# Patient Record
Sex: FEMALE | Race: WHITE | Employment: OTHER | URBAN - METROPOLITAN AREA
[De-identification: names, ages, dates, MRNs, and addresses within clinical notes are randomized per-mention and may not be internally consistent; named-entity substitution may affect disease eponyms.]

---

## 2017-02-14 PROBLEM — E53.8 B12 DEFICIENCY: Status: ACTIVE | Noted: 2017-02-14

## 2017-12-29 ENCOUNTER — APPOINTMENT (OUTPATIENT)
Dept: CT IMAGING | Age: 82
DRG: 641 | End: 2017-12-29
Attending: EMERGENCY MEDICINE
Payer: MEDICARE

## 2017-12-29 ENCOUNTER — HOSPITAL ENCOUNTER (INPATIENT)
Age: 82
LOS: 1 days | Discharge: NURSING, REHAB OR LONG-TERM CARE | DRG: 641 | End: 2017-12-30
Attending: EMERGENCY MEDICINE | Admitting: INTERNAL MEDICINE
Payer: MEDICARE

## 2017-12-29 ENCOUNTER — APPOINTMENT (OUTPATIENT)
Dept: GENERAL RADIOLOGY | Age: 82
DRG: 641 | End: 2017-12-29
Attending: EMERGENCY MEDICINE
Payer: MEDICARE

## 2017-12-29 DIAGNOSIS — R53.83 FATIGUE, UNSPECIFIED TYPE: Primary | ICD-10-CM

## 2017-12-29 DIAGNOSIS — J18.9 HAP (HOSPITAL-ACQUIRED PNEUMONIA): ICD-10-CM

## 2017-12-29 DIAGNOSIS — R77.8 ELEVATED TROPONIN I LEVEL: ICD-10-CM
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
## 2017-12-29 DIAGNOSIS — R74.8 ABNORMAL CK: ICD-10-CM

## 2017-12-29 PROBLEM — E53.8 B12 DEFICIENCY: Chronic | Status: ACTIVE | Noted: 2017-02-14

## 2017-12-29 PROBLEM — I21.4 NSTEMI (NON-ST ELEVATED MYOCARDIAL INFARCTION) (HCC): Status: ACTIVE | Noted: 2017-12-29

## 2017-12-29 PROBLEM — F03.90 DEMENTIA (HCC): Chronic | Status: ACTIVE | Noted: 2017-12-29

## 2017-12-29 LAB
ALBUMIN SERPL-MCNC: 3.4 G/DL (ref 3.5–5)
ALBUMIN/GLOB SERPL: 0.8 {RATIO} (ref 1.1–2.2)
ALP SERPL-CCNC: 46 U/L (ref 45–117)
ALT SERPL-CCNC: 89 U/L (ref 12–78)
ANION GAP SERPL CALC-SCNC: 9 MMOL/L (ref 5–15)
APPEARANCE UR: CLEAR
AST SERPL-CCNC: 179 U/L (ref 15–37)
BACTERIA URNS QL MICRO: NEGATIVE /HPF
BASOPHILS # BLD: 0 K/UL (ref 0–0.1)
BASOPHILS NFR BLD: 1 % (ref 0–1)
BILIRUB SERPL-MCNC: 0.4 MG/DL (ref 0.2–1)
BILIRUB UR QL: NEGATIVE
BNP SERPL-MCNC: 7148 PG/ML (ref 0–450)
BUN SERPL-MCNC: 18 MG/DL (ref 6–20)
BUN/CREAT SERPL: 20 (ref 12–20)
CALCIUM SERPL-MCNC: 8.7 MG/DL (ref 8.5–10.1)
CHLORIDE SERPL-SCNC: 104 MMOL/L (ref 97–108)
CK MB CFR SERPL CALC: 10.7 % (ref 0–2.5)
CK MB SERPL-MCNC: 389.6 NG/ML (ref 5–25)
CK SERPL-CCNC: 2783 U/L (ref 26–192)
CK SERPL-CCNC: 3645 U/L (ref 26–192)
CO2 SERPL-SCNC: 27 MMOL/L (ref 21–32)
COLOR UR: ABNORMAL
CREAT SERPL-MCNC: 0.91 MG/DL (ref 0.55–1.02)
EOSINOPHIL # BLD: 0.1 K/UL (ref 0–0.4)
EOSINOPHIL NFR BLD: 2 % (ref 0–7)
EPITH CASTS URNS QL MICRO: ABNORMAL /LPF
ERYTHROCYTE [DISTWIDTH] IN BLOOD BY AUTOMATED COUNT: 15.2 % (ref 11.5–14.5)
GLOBULIN SER CALC-MCNC: 4.1 G/DL (ref 2–4)
GLUCOSE SERPL-MCNC: 99 MG/DL (ref 65–100)
GLUCOSE UR STRIP.AUTO-MCNC: NEGATIVE MG/DL
HCT VFR BLD AUTO: 40.4 % (ref 35–47)
HGB BLD-MCNC: 12.5 G/DL (ref 11.5–16)
HGB UR QL STRIP: ABNORMAL
HYALINE CASTS URNS QL MICRO: ABNORMAL /LPF (ref 0–5)
INR PPP: 1.1 (ref 0.9–1.1)
KETONES UR QL STRIP.AUTO: ABNORMAL MG/DL
LACTATE SERPL-SCNC: 1.3 MMOL/L (ref 0.4–2)
LEUKOCYTE ESTERASE UR QL STRIP.AUTO: NEGATIVE
LIPASE SERPL-CCNC: 332 U/L (ref 73–393)
LYMPHOCYTES # BLD: 0.9 K/UL (ref 0.8–3.5)
LYMPHOCYTES NFR BLD: 11 % (ref 12–49)
MAGNESIUM SERPL-MCNC: 2.2 MG/DL (ref 1.6–2.4)
MCH RBC QN AUTO: 29.4 PG (ref 26–34)
MCHC RBC AUTO-ENTMCNC: 30.9 G/DL (ref 30–36.5)
MCV RBC AUTO: 95.1 FL (ref 80–99)
MONOCYTES # BLD: 0.8 K/UL (ref 0–1)
MONOCYTES NFR BLD: 11 % (ref 5–13)
NEUTS SEG # BLD: 6 K/UL (ref 1.8–8)
NEUTS SEG NFR BLD: 75 % (ref 32–75)
NITRITE UR QL STRIP.AUTO: NEGATIVE
PH UR STRIP: 5.5 [PH] (ref 5–8)
PLATELET # BLD AUTO: 234 K/UL (ref 150–400)
POTASSIUM SERPL-SCNC: 4.9 MMOL/L (ref 3.5–5.1)
PROT SERPL-MCNC: 7.5 G/DL (ref 6.4–8.2)
PROT UR STRIP-MCNC: ABNORMAL MG/DL
PROTHROMBIN TIME: 10.8 SEC (ref 9–11.1)
RBC # BLD AUTO: 4.25 M/UL (ref 3.8–5.2)
RBC #/AREA URNS HPF: ABNORMAL /HPF (ref 0–5)
SODIUM SERPL-SCNC: 140 MMOL/L (ref 136–145)
SP GR UR REFRACTOMETRY: 1.01 (ref 1–1.03)
T4 FREE SERPL-MCNC: 1.5 NG/DL (ref 0.8–1.5)
TROPONIN I SERPL-MCNC: 0.21 NG/ML
TROPONIN I SERPL-MCNC: 0.22 NG/ML
TSH SERPL DL<=0.05 MIU/L-ACNC: 2.17 UIU/ML (ref 0.36–3.74)
UA: UC IF INDICATED,UAUC: ABNORMAL
UROBILINOGEN UR QL STRIP.AUTO: 0.2 EU/DL (ref 0.2–1)
WBC # BLD AUTO: 7.8 K/UL (ref 3.6–11)
WBC URNS QL MICRO: ABNORMAL /HPF (ref 0–4)

## 2017-12-29 PROCEDURE — 93306 TTE W/DOPPLER COMPLETE: CPT

## 2017-12-29 PROCEDURE — 96361 HYDRATE IV INFUSION ADD-ON: CPT

## 2017-12-29 PROCEDURE — 83690 ASSAY OF LIPASE: CPT | Performed by: EMERGENCY MEDICINE

## 2017-12-29 PROCEDURE — 84443 ASSAY THYROID STIM HORMONE: CPT | Performed by: EMERGENCY MEDICINE

## 2017-12-29 PROCEDURE — 83735 ASSAY OF MAGNESIUM: CPT | Performed by: EMERGENCY MEDICINE

## 2017-12-29 PROCEDURE — 74011250636 HC RX REV CODE- 250/636: Performed by: INTERNAL MEDICINE

## 2017-12-29 PROCEDURE — 84484 ASSAY OF TROPONIN QUANT: CPT | Performed by: EMERGENCY MEDICINE

## 2017-12-29 PROCEDURE — 80053 COMPREHEN METABOLIC PANEL: CPT | Performed by: EMERGENCY MEDICINE

## 2017-12-29 PROCEDURE — 82550 ASSAY OF CK (CPK): CPT | Performed by: EMERGENCY MEDICINE

## 2017-12-29 PROCEDURE — 99284 EMERGENCY DEPT VISIT MOD MDM: CPT

## 2017-12-29 PROCEDURE — 81001 URINALYSIS AUTO W/SCOPE: CPT | Performed by: EMERGENCY MEDICINE

## 2017-12-29 PROCEDURE — 74011250637 HC RX REV CODE- 250/637: Performed by: NURSE PRACTITIONER

## 2017-12-29 PROCEDURE — 87040 BLOOD CULTURE FOR BACTERIA: CPT | Performed by: EMERGENCY MEDICINE

## 2017-12-29 PROCEDURE — 74011250636 HC RX REV CODE- 250/636: Performed by: EMERGENCY MEDICINE

## 2017-12-29 PROCEDURE — 87899 AGENT NOS ASSAY W/OPTIC: CPT | Performed by: INTERNAL MEDICINE

## 2017-12-29 PROCEDURE — 83880 ASSAY OF NATRIURETIC PEPTIDE: CPT | Performed by: EMERGENCY MEDICINE

## 2017-12-29 PROCEDURE — 84439 ASSAY OF FREE THYROXINE: CPT | Performed by: EMERGENCY MEDICINE

## 2017-12-29 PROCEDURE — 74011250636 HC RX REV CODE- 250/636: Performed by: NURSE PRACTITIONER

## 2017-12-29 PROCEDURE — 83605 ASSAY OF LACTIC ACID: CPT | Performed by: EMERGENCY MEDICINE

## 2017-12-29 PROCEDURE — 71020 XR CHEST PA LAT: CPT

## 2017-12-29 PROCEDURE — 82553 CREATINE MB FRACTION: CPT | Performed by: EMERGENCY MEDICINE

## 2017-12-29 PROCEDURE — 74011000258 HC RX REV CODE- 258: Performed by: INTERNAL MEDICINE

## 2017-12-29 PROCEDURE — 93005 ELECTROCARDIOGRAM TRACING: CPT

## 2017-12-29 PROCEDURE — 94762 N-INVAS EAR/PLS OXIMTRY CONT: CPT

## 2017-12-29 PROCEDURE — 70450 CT HEAD/BRAIN W/O DYE: CPT

## 2017-12-29 PROCEDURE — 85610 PROTHROMBIN TIME: CPT | Performed by: EMERGENCY MEDICINE

## 2017-12-29 PROCEDURE — 36415 COLL VENOUS BLD VENIPUNCTURE: CPT | Performed by: EMERGENCY MEDICINE

## 2017-12-29 PROCEDURE — 74011250637 HC RX REV CODE- 250/637: Performed by: INTERNAL MEDICINE

## 2017-12-29 PROCEDURE — 96365 THER/PROPH/DIAG IV INF INIT: CPT

## 2017-12-29 PROCEDURE — 85025 COMPLETE CBC W/AUTO DIFF WBC: CPT | Performed by: EMERGENCY MEDICINE

## 2017-12-29 RX ORDER — ACETAMINOPHEN 500 MG
500 TABLET ORAL 3 TIMES DAILY
COMMUNITY

## 2017-12-29 RX ORDER — GUAIFENESIN 100 MG/5ML
81 LIQUID (ML) ORAL DAILY
Status: DISCONTINUED | OUTPATIENT
Start: 2017-12-29 | End: 2017-12-30

## 2017-12-29 RX ORDER — OXYCODONE AND ACETAMINOPHEN 5; 325 MG/1; MG/1
1 TABLET ORAL
Status: DISCONTINUED | OUTPATIENT
Start: 2017-12-29 | End: 2017-12-30 | Stop reason: HOSPADM

## 2017-12-29 RX ORDER — HYDROMORPHONE HYDROCHLORIDE 2 MG/ML
0.5 INJECTION, SOLUTION INTRAMUSCULAR; INTRAVENOUS; SUBCUTANEOUS
Status: DISCONTINUED | OUTPATIENT
Start: 2017-12-29 | End: 2017-12-30 | Stop reason: HOSPADM

## 2017-12-29 RX ORDER — SODIUM CHLORIDE 0.9 % (FLUSH) 0.9 %
5-10 SYRINGE (ML) INJECTION AS NEEDED
Status: DISCONTINUED | OUTPATIENT
Start: 2017-12-29 | End: 2017-12-30 | Stop reason: HOSPADM

## 2017-12-29 RX ORDER — BUSPIRONE HYDROCHLORIDE 7.5 MG/1
7.5 TABLET ORAL 2 TIMES DAILY
COMMUNITY

## 2017-12-29 RX ORDER — FUROSEMIDE 10 MG/ML
20 INJECTION INTRAMUSCULAR; INTRAVENOUS ONCE
Status: COMPLETED | OUTPATIENT
Start: 2017-12-29 | End: 2017-12-29

## 2017-12-29 RX ORDER — DONEPEZIL HYDROCHLORIDE 5 MG/1
10 TABLET, FILM COATED ORAL EVERY EVENING
Status: DISCONTINUED | OUTPATIENT
Start: 2017-12-29 | End: 2017-12-30 | Stop reason: HOSPADM

## 2017-12-29 RX ORDER — ENOXAPARIN SODIUM 100 MG/ML
1 INJECTION SUBCUTANEOUS EVERY 12 HOURS
Status: DISCONTINUED | OUTPATIENT
Start: 2017-12-29 | End: 2017-12-30

## 2017-12-29 RX ORDER — MELOXICAM 7.5 MG/1
TABLET ORAL DAILY
COMMUNITY
End: 2017-12-29

## 2017-12-29 RX ORDER — LEVOTHYROXINE SODIUM 88 UG/1
88 TABLET ORAL
Status: DISCONTINUED | OUTPATIENT
Start: 2017-12-30 | End: 2017-12-30 | Stop reason: HOSPADM

## 2017-12-29 RX ORDER — LEVOTHYROXINE SODIUM 88 UG/1
88 TABLET ORAL
COMMUNITY

## 2017-12-29 RX ORDER — LEVOFLOXACIN 5 MG/ML
750 INJECTION, SOLUTION INTRAVENOUS
Status: COMPLETED | OUTPATIENT
Start: 2017-12-29 | End: 2017-12-29

## 2017-12-29 RX ORDER — ACETAMINOPHEN 325 MG/1
650 TABLET ORAL
Status: DISCONTINUED | OUTPATIENT
Start: 2017-12-29 | End: 2017-12-30 | Stop reason: HOSPADM

## 2017-12-29 RX ORDER — BUSPIRONE HYDROCHLORIDE 5 MG/1
7.5 TABLET ORAL 2 TIMES DAILY
Status: DISCONTINUED | OUTPATIENT
Start: 2017-12-29 | End: 2017-12-30 | Stop reason: HOSPADM

## 2017-12-29 RX ORDER — SIMVASTATIN 20 MG/1
40 TABLET, FILM COATED ORAL EVERY EVENING
Status: DISCONTINUED | OUTPATIENT
Start: 2017-12-29 | End: 2017-12-30

## 2017-12-29 RX ORDER — SODIUM CHLORIDE 0.9 % (FLUSH) 0.9 %
5-10 SYRINGE (ML) INJECTION EVERY 8 HOURS
Status: DISCONTINUED | OUTPATIENT
Start: 2017-12-29 | End: 2017-12-30 | Stop reason: HOSPADM

## 2017-12-29 RX ORDER — DONEPEZIL HYDROCHLORIDE 10 MG/1
10 TABLET, FILM COATED ORAL EVERY EVENING
COMMUNITY

## 2017-12-29 RX ORDER — MELOXICAM 7.5 MG/1
7.5 TABLET ORAL
COMMUNITY

## 2017-12-29 RX ORDER — ACETAMINOPHEN 325 MG/1
650 TABLET ORAL
COMMUNITY

## 2017-12-29 RX ORDER — PROCHLORPERAZINE EDISYLATE 5 MG/ML
10 INJECTION INTRAMUSCULAR; INTRAVENOUS
Status: DISCONTINUED | OUTPATIENT
Start: 2017-12-29 | End: 2017-12-30 | Stop reason: HOSPADM

## 2017-12-29 RX ORDER — LANOLIN ALCOHOL/MO/W.PET/CERES
1000 CREAM (GRAM) TOPICAL DAILY
COMMUNITY

## 2017-12-29 RX ORDER — SIMVASTATIN 40 MG/1
40 TABLET, FILM COATED ORAL EVERY EVENING
COMMUNITY

## 2017-12-29 RX ADMIN — FUROSEMIDE: 10 INJECTION, SOLUTION INTRAMUSCULAR; INTRAVENOUS at 17:36

## 2017-12-29 RX ADMIN — CEFTRIAXONE SODIUM 1 G: 1 INJECTION, POWDER, FOR SOLUTION INTRAMUSCULAR; INTRAVENOUS at 21:14

## 2017-12-29 RX ADMIN — BUSPIRONE HYDROCHLORIDE 7.5 MG: 5 TABLET ORAL at 21:20

## 2017-12-29 RX ADMIN — ASPIRIN 81 MG 81 MG: 81 TABLET ORAL at 17:36

## 2017-12-29 RX ADMIN — SODIUM CHLORIDE 1000 ML: 9 INJECTION, SOLUTION INTRAVENOUS at 12:12

## 2017-12-29 RX ADMIN — LEVOFLOXACIN 750 MG: 5 INJECTION, SOLUTION INTRAVENOUS at 12:49

## 2017-12-29 RX ADMIN — ENOXAPARIN SODIUM 60 MG: 60 INJECTION SUBCUTANEOUS at 21:21

## 2017-12-29 RX ADMIN — SIMVASTATIN 40 MG: 20 TABLET, FILM COATED ORAL at 21:21

## 2017-12-29 RX ADMIN — SODIUM CHLORIDE 1000 ML: 9 INJECTION, SOLUTION INTRAVENOUS at 12:52

## 2017-12-29 RX ADMIN — DONEPEZIL HYDROCHLORIDE 10 MG: 5 TABLET, FILM COATED ORAL at 21:21

## 2017-12-29 NOTE — ED PROVIDER NOTES
HPI Comments: 80 y.o. female with past medical history significant for hypothyroidism, PVCs, dermatomyositis, interstitial pulmonary fibrosis, Raynaud's disease, orthopedic, cataract who presents from home with chief complaint of weakness. Pt's son reports for the past 2 months there's been a marked decrease in the patient's appetite, weakness, fatigue, difficulty getting around, and she seems more out of breath than normal. Pt is due to start OT and physical therapy next week at [facility], where she resides and family wants the pt checked out. Pt denies any fever, chills, nausea, vomiting, diarrhea, or constipation. Pt seen by PCP last year and a lump was noted to her breast, but pt didn't want anyhting done about it at that time. There are no other acute medical concerns at this time. PCP: Renuka Foley MD    Full history, physical exam, and ROS unable to be obtained due to:  dementia. Note written by Kitty Conley, as dictated by Bouchra Donnelly MD 10:37 AM      The history is provided by a relative. No  was used.         Past Medical History:   Diagnosis Date    B12 deficiency 2/14/2017    [facility]atomy[address] ([facility] [address]     Hypercholesterolemia     Hypothyroidism 7/27/2011    Ill-defined condition     SOME MEMORY PROBLEM    Interstitial pulmonary fibrosis ([facility] [address]     Osteoporosis 7/27/2011    Psychiatric disorder 2009    \"EXTREME EMOTIONAL DISTRESS; PT COULD DIE AT AT TIME\" AFTER SON PASSED [id number]    PVC's 7/27/2011    Raynaud's disease        Past Surgical History:   Procedure Laterality Date    HX HEENT      CATARACT    HX ORTHOPAEDIC      ROTATOR CUFF    HX DORA AND BSO      HX TONSILLECTOMY           Family History:   Problem Relation Age of Onset    Anesth Problems Neg Hx     Cancer Son      GASTRIC       Social History     Social History    Marital status:      Spouse name: N/A    Number of children: N/A    Years of education: N/A     Occupational History  Not on file. Social History Main Topics    Smoking status: Never Smoker    Smokeless tobacco: Never Used    Alcohol use No    Drug use: No    Sexual activity: Not on file     Other Topics Concern    Not on file     Social History Narrative         ALLERGIES: Pcn [penicillins] and Sulfa (sulfonamide antibiotics)    Review of Systems   Constitutional: Positive for appetite change and fatigue. Negative for chills and fever. Respiratory: Positive for shortness of breath. Cardiovascular: Negative for chest pain. Gastrointestinal: Negative for abdominal pain, nausea and vomiting. Neurological: Positive for weakness. Negative for dizziness. All other systems reviewed and are negative. Vitals:    12/29/17 1018   BP: 152/77   Pulse: 88   Resp: 18   Temp: 98 °F (36.7 °C)   SpO2: 93%   Weight: 59.4 kg (131 lb)   Height: 5' 7\" (1.702 m)            Physical Exam   Constitutional: She appears well-developed and well-nourished. No distress. NAD, AxOx1, speaking in complete sentences     HENT:   Head: Normocephalic and atraumatic. Nose: Nose normal.   Mouth/Throat: Oropharynx is clear and moist.   Cn intact    No facial droop/ slurred speech/ tongue deviation   Eyes: Conjunctivae and EOM are normal. Pupils are equal, round, and reactive to light. Right eye exhibits no discharge. Left eye exhibits no discharge. No scleral icterus. Neck: Normal range of motion. Neck supple. No JVD present. No tracheal deviation present. Cardiovascular: Normal rate, regular rhythm, normal heart sounds and intact distal pulses. Exam reveals no gallop and no friction rub. No murmur heard. Pulmonary/Chest: Effort normal and breath sounds normal. No respiratory distress. She has no wheezes. She has no rales. She exhibits no tenderness. Abdominal: Soft. Bowel sounds are normal. There is no tenderness. There is no rebound and no guarding. nttp     Genitourinary: No vaginal discharge found.    Genitourinary Comments: Pt denies urinary/ vaginal complaints   Musculoskeletal: Normal range of motion. She exhibits no edema or tenderness. Neurological: She is alert. She has normal reflexes. She displays normal reflexes. No cranial nerve deficit. She exhibits normal muscle tone. Coordination normal.   pt has motor/ CV/ Sensation grossly intact to all extremities, R = L in strength;   Skin: Skin is warm and dry. No rash noted. She is not diaphoretic. No erythema. No pallor. Psychiatric: She has a normal mood and affect. Her behavior is normal. Thought content normal.   Nursing note and vitals reviewed. MDM  Number of Diagnoses or Management Options  Abnormal CK:   Elevated troponin I level:   Fatigue, unspecified type:   HAP (hospital-acquired pneumonia):   Risk of Complications, Morbidity, and/or Mortality  Presenting problems: high  Diagnostic procedures: moderate  Management options: moderate    Critical Care  Total time providing critical care: 30-74 minutes (45 min)    Patient Progress  Patient progress: improved    ED Course       Procedures    Chief Complaint   Patient presents with    Shortness of Breath       10:35 AM  The patients presenting problems have been discussed, and they are in agreement with the care plan formulated and outlined with them. I have encouraged them to ask questions as they arise throughout their visit. MEDICATIONS GIVEN:  Medications - No data to display    LABS REVIEWED:  Labs Reviewed   CBC WITH AUTOMATED DIFF   METABOLIC PANEL, COMPREHENSIVE   CK W/ REFLX CKMB   SAMPLES BEING HELD   TROPONIN I       RADIOLOGY RESULTS:  The following have been ordered and reviewed:  _____________________________________________________________________  _____________________________________________________________________    EKG interpretation: (Preliminary)  Rhythm: normal sinus rhythm; and ir-regular due to a PVC.  Rate (approx.): 87; Axis: normal; P wave: normal; QRS interval: normal ; ST/T wave: normal; Negative acute significant segmental elevations    PROCEDURES:        CONSULTATIONS:       PROGRESS NOTES:      DIAGNOSIS:    1. Fatigue, unspecified type    2. Elevated troponin I level    3. Abnormal CK    4. HAP (hospital-acquired pneumonia)        PLAN:  1- fatigue/ elevated trop/ abn CK - cardiology aware;   2 HAP from NH/ Possible Pulmonary fibrosis/ Levaquin/ cultures;     ED COURSE: The patients hospital course has been uncomplicated. CONSULT  NOTE  12:27 PM  Danay Fink MD spoke with Dr Liat Gayle. Specialty: CARDIOLOGY  Discussed pt's hx, disposition, and available diagnostic and imaging results. Reviewed care plans. Consulting physician agrees with plans as outlined 'please have hospital admit/ we will see in consult'; .       1:26 PM  Patient is being evaluated for admission to the hospital by the hospitalist, Dr Donn Rosenbaum . The results of their tests and reasons for their admission have been discussed with them and/or available family. They convey agreement and understanding for the need to be admitted and for their admission diagnosis. Consultation has been made with the inpatient physician specialist for hospitalization.

## 2017-12-29 NOTE — ED TRIAGE NOTES
Pt here with family who states, Rena Soto needs a mini check up, she has been having increasing SOB and weakness x2 months. \" pt denies CP. Pt reports SOB with exertion.

## 2017-12-29 NOTE — ED NOTES
Pt is moved to bed number 17 in an effort to be in a less stimulating environment. Pt ambulated to restroom without difficulty. Gait was steady and even. No assistance required. Pt returned to room, monitoring equipment reapplied. Will continue to monitor patient.

## 2017-12-29 NOTE — H&P
[address] [facility]  [address] [facility], [facility]  [phone]    Admission History and Physical      NAME:  Bridget Rhodes   :   1933   MRN:  [id number]     PCP:  Manoj Molina NP     Date/Time:  2017         Subjective:     CHIEF COMPLAINT: SOB     HISTORY OF PRESENT ILLNESS:     Ms. Keara Sylvester is a 80 y.o.  female who is admitted with NSTEMI. Ms. Harris presented to the Emergency Department this AM complaining of SOB: for the past 2 months, moderate, slowly increasing, associated with weakness, fatigue, decreased appetite, worse with exertion    History obtained from child, chart review and unobtainable from patient due to dementia. Previous records reviewed    Past Medical History:   Diagnosis Date    Anxiety     B12 deficiency 2017    Dementia 2017    [facility]ermatomyositis ([facility] [address]     Hypercholesterolemia     Hypothyroidism 2011    Ill-defined condition     SOME MEMORY PROBLEM    Interstitial pulmonary fibrosis ([facility] [address]     Osteoporosis 2011    Psychiatric disorder     \"EXTREME EMOTIONAL DISTRESS; PT COULD DIE AT AT TIME\" AFTER SON PASSED [id number]    PVC's 2011    Raynaud's disease         Past Surgical History:   Procedure Laterality Date    HX HEENT      CATARACT    HX HIP REPLACEMENT Right     HX ORTHOPAEDIC      ROTATOR CUFF    HX DORA AND BSO      HX TONSILLECTOMY         Social History   Substance Use Topics    Smoking status: Never Smoker    Smokeless tobacco: Never Used    Alcohol use No        Family History   Problem Relation Age of Onset    Cancer Son      GASTRIC    Anesth Problems Neg Hx         Allergies   Allergen Reactions    Pcn [Penicillins] Unknown (comments)     \"I DON'T RECALL\"    Sulfa (Sulfonamide Antibiotics) Unknown (comments)     \"I DON'T RECALL\"        Prior to Admission medications    Medication Sig Start Date End Date Taking?  Authorizing Provider   Arnulfo Fernando 500 mg by Does Not Apply route as needed. Yes Edmond Gage MD   busPIRone (BUSPAR) 7.5 mg tablet Take 7.5 mg by mouth two (2) times a day. Yes Edmond Gage MD   cyanocobalamin (VITAMIN B-12) 1,000 mcg tablet Take 1,000 mcg by mouth daily. Yes Edmond Gage MD   meloxicam (MOBIC) 7.5 mg tablet Take  by mouth daily. Yes Edmond Gage MD   donepezil (ARICEPT) 10 mg tablet TAKE 1 TABLET BY MOUTH AT NIGHT 2/17/17   Mac Pop MD   levothyroxine (SYNTHROID) 88 mcg tablet Take 1 Tab by mouth six (6) days a week.  2/17/17   Mac Pop MD   simvastatin (ZOCOR) 40 mg tablet TAKE 1 TABLET BY MOUTH AT BEDTIME 2/17/17   Mac Pop MD         Review of Systems:  (bold if positive, if negative)    Gen:  fatigueEyes:  ENT:  CVS:  Pulm:  dyspneaGI:    :    MS:  Skin:  Psych:  Endo:    Hem:  Renal:    Neuro:  weakness - generalized          Objective:      VITALS:    Vital signs reviewed; most recent are:    Visit Vitals    /77 (BP 1 Location: Left arm, BP Patient Position: Sitting)    Pulse 88    Temp 98 °F (36.7 °C)    Resp 18    Ht 5' 7\" (1.702 m)    Wt 59.4 kg (131 lb)    SpO2 93%    BMI 20.52 kg/m2     SpO2 Readings from Last 6 Encounters:   12/29/17 93%   02/17/17 98%   02/03/16 99%   07/31/15 99%   07/08/15 95%   05/15/15 94%        No intake or output data in the 24 hours ending 12/29/17 1336         Exam:     Physical Exam:    Gen: Well-developed, well-nourished, in no acute distress  HEENT:  Pink conjunctivae, PERRL, hearing intact to voice, moist mucous membranes  Neck: Supple, without masses, thyroid non-tender  Resp: No accessory muscle use, clear breath sounds without wheezes rales or rhonchi  Card: 3/6 SAUNDRA, normal S1, S2 without thrills, bruits or peripheral edema  Abd:  Soft, non-tender, non-distended, normoactive bowel sounds are present, no palpable organomegaly and no detectable hernias  Lymph:  No cervical or inguinal adenopathy  Musc: No cyanosis or clubbing  Skin: No rashes or ulcers, skin turgor is good  Neuro:  Cranial nerves are grossly intact, no focal motor weakness, follows commands appropriately  Psych:  Fair insight, oriented to person, but not place and time, alert             Labs:    Recent Labs      12/29/17   1050   WBC  7.8   HGB  12.5   HCT  40.4   PLT  234     Recent Labs      12/29/17   1051  12/29/17   1050   NA   --   140   K   --   4.9   CL   --   104   CO2   --   27   GLU   --   99   BUN   --   18   CREA   --   0.91   CA   --   8.7   MG  2.2   --    ALB   --   3.4*   TBILI   --   0.4   SGOT   --   179*   ALT   --   89*     Lab Results   Component Value Date/Time    Glucose (POC) 103 07/08/2015 06:24 AM    Glucose (POC) 163 07/07/2015 09:45 PM     No results for input(s): PH, PCO2, PO2, HCO3, FIO2 in the last 72 hours. Recent Labs      12/29/17   1050   INR  1.1       Additional testing:  Chest X-ray: possible LLL ASDz, visualized by me. Results not reviewed with Radiologist.       Assessment/Plan:       Principal Problem:    NSTEMI (non-ST elevated myocardial infarction) ([facility] [address] (12/29/2017)   - has a positive troponin and positive CK relative index in the setting of possible anginal symptoms, I think she likely had a NSTEMI recently   - Cardiology evaluating, I don't think it makes sense to be aggressive when Ms. Harris already refused work up for possible breast cancer   - full dose enoxaparin for anticoagulation for now   - consult Palliative Care    Active Problems:    Hypothyroidism (7/27/2011)   - continue LT4      Interstitial pulmonary fibrosis (HCC) ()   - findings on CXR may be duet to IPF, consult Pulmonary      Hypercholesterolemia ()   - continue statin      Dermatomyositis ([facility] [address] ()   - this may be the cause of part of her elevated CK but does not explain elevated troponin or elevated relative index   - apparently only on NSAIDs   - recommend outpatient Rheumatology follow up      Dementia (12/29/2017)   - mental status is at baseline per son Pneumonia (12/29/2017)   - possible   - she has SOB but really doesn't have any other symptoms of signs of pneumonia and her SOB is more chronic   - given levofloxacin, I'm concerned about quinolone use in the elderly so will change to ceftriaxone and azithromycin for now   - consult Pulmonary as above   - send pneumonia work up, blood cultures already done       Code status:   - patient is DO NOT RESUSCITATE      Total time spent with patient: [address] discussed with: Patient, Family, Nursing Staff and Dr. Trey Maki    Discussed:  Code Status, Care Plan and D/C Planning    Prophylaxis:  Lovenox and SCD's    Probable Disposition:  SNF/LTC           ___________________________________________________    Attending Physician: Topher Stark MD

## 2017-12-29 NOTE — CONSULTS
Conception MD Bre [facility] [address]  Office [phone]  Mobile [phone]    Date of  Admission: 12/29/2017 10:24 AM       Assessment/Plan:   SOB/AHN: chest x-ray suggestive of PNA, also with known hx of pulmonary fibrosis. - started on antibiotics  -worsening AHN/weakness over the last 2 months will obtain echo to evaluate LVEF. Discussed possible findings (decline in LVEF) with patient and her son (POA), she would want no more than medical management at this point. -BNP 7148  -will empirically try low dose diuretic and reassess volume status going forward (normal creatine and K/mag)    Cardiac murmur:  will obtain echo for further evaluation. Elevated troponin: minimally elevated 0.22 with elevated CK/CKMB. -CK 3645/CK-.6/CK-MB 10.7  - Given the very high CK, but very low troponin, I suspect the elevated CK may be due to a non-cardiac cause. Follow serial markers and check an echo to start.   -serial troponins  - start ASA  - no statin due to high CK and elevated LFTS    Pulmonary fibrosis: known, does not wish to see a pulmonologist anymore. Hypothyroid: TSH WNL, on synthriod. Elevated LFTS: AST 89/    DNR and she has an advanced medical directive per her son. Thank you for allowing us to participate in [facility]. We will be happy to follow along. Please call for any questions. CARDIOLOGY ATTENDING  Patient personally seen and examined. All the elements of history and examination were personally performed. Assessment and plan was discussed and agree as written above    I reviewed and edited plans above. Plan to check an echo to assess heart structure and function. Notable murmur on exam.     SOB and elevated proBNP could be due to decompensated CHF. Check an echo.   I gave a low dose IV diuretic today -- can reassess and write for further diuretics tomorrow if she is still thought to be volume overloaded. Pulmonary fibrosis and pneumonia could also be cause for her SOB. Of note, son does not wish to pursue aggressive measures or invasive testing in his mom at this time. Annemarie Mclaughlin MD, [facility]          Consult requested by Yamila Meza MD for elevated troponin and elevated pro BNP     Subjective:  Robert Piedra is a 80 y.o.   female with PMH significant for: pulmonary fibrosis, dementia, hypothyroid, raynaud's disease, dermatomyositis, PVC's and dyslipidemia. She presents today to the ED with the compliant of progressive SOB/AHN and weakness. Decrease in fluid intake and appetite. Her son reports a significant decline in her health over the last 2 months. She lives at [facility]. Her son is her POA. She uses a walker to ambulate. Cardiac risk factors:  Dyslipidemia     LABS:   Troponin:  0.22     ProBNP: 7148    CXR:  There is increasing left basilar atelectasis/airspace disease which  could represent pneumonia. Cardiographics:   Telemetry: sinus rhythm    ECG: sinus rhythm left axis deviation, LVH (lead AVL)    Cardiac Testing/ Procedures:   SOCIAL HX: denies tobacco/ETOH. Lives at [facility].         Patient Active Problem List    Diagnosis Date Noted    B12 deficiency 02/14/2017    Arthritis of right hip 07/06/2015    Raynaud's disease     Interstitial pulmonary fibrosis ([address]     Dermatomyositis ([address]     Interstitial pulmonary fibrosis ([address]     Hypercholesterolemia     Hypothyroidism 07/27/2011    PVC's 07/27/2011    Osteoporosis 07/27/2011    Abnormal electrocardiogram (ECG) (EKG) 11/01/2010    Other specified acquired hypothyroidism 11/01/2010    Palpitations 11/01/2010      Past Medical History:   Diagnosis Date    Anxiety     B12 deficiency 2/14/2017    Dermatomyositis ([address]     Hypercholesterolemia     Hypothyroidism 7/27/2011    Ill-defined condition     SOME MEMORY PROBLEM    Interstitial pulmonary fibrosis ([address]     Osteoporosis 7/27/2011    Psychiatric disorder 2009    \"EXTREME EMOTIONAL DISTRESS; PT COULD DIE AT AT TIME\" AFTER SON PASSED [id number]    PVC's 7/27/2011    Raynaud's disease       Past Surgical History:   Procedure Laterality Date    HX HEENT      CATARACT    HX HIP REPLACEMENT Right     HX ORTHOPAEDIC      ROTATOR CUFF    HX DORA AND BSO      HX TONSILLECTOMY       Allergies   Allergen Reactions    Pcn [Penicillins] Unknown (comments)     \"I DON'T RECALL\"    Sulfa (Sulfonamide Antibiotics) Unknown (comments)     \"I DON'T RECALL\"      Current Facility-Administered Medications   Medication Dose Route Frequency    sodium chloride 0.9 % bolus infusion 1,000 mL  1,000 mL IntraVENous NOW    sodium chloride 0.9 % bolus infusion 1,000 mL  1,000 mL IntraVENous ONCE    levoFLOXacin (LEVAQUIN) 750 mg in D5W IVPB  750 mg IntraVENous NOW     Current Outpatient Prescriptions   Medication Sig    ACETAMINOPHEN, BULK, 500 mg by Does Not Apply route as needed.  busPIRone (BUSPAR) 7.5 mg tablet Take 7.5 mg by mouth two (2) times a day.  cyanocobalamin (VITAMIN B-12) 1,000 mcg tablet Take 1,000 mcg by mouth daily.  meloxicam (MOBIC) 7.5 mg tablet Take  by mouth daily.  donepezil (ARICEPT) 10 mg tablet TAKE 1 TABLET BY MOUTH AT NIGHT    levothyroxine (SYNTHROID) 88 mcg tablet Take 1 Tab by mouth six (6) days a week.  simvastatin (ZOCOR) 40 mg tablet TAKE 1 TABLET BY MOUTH AT BEDTIME          Review of Symptoms:  Constitutional: positive for fatigue, malaise and anorexia  ENT: negative   Respiratory: positive for pneumonia or dyspnea on exertion  Gastrointestinal: positive for decreased appeitite  Genitourinary: dysuria, hematuria, frequency   Musculoskeletal:negative  Neurological: positive for weakness  Other systems reviewed and negative except as above.   Social History     Social History    Marital status:      Spouse name: N/A    Number of children: N/A    Years of education: N/A     Social History Main Topics    Smoking status: Never Smoker    Smokeless tobacco: Never Used    Alcohol use No    Drug use: No    Sexual activity: Not on file     Other Topics Concern    Not on file     Social History Narrative     Family History   Problem Relation Age of Onset    Cancer Son      GASTRIC    Anesth Problems Neg Hx          Physical Exam    Visit Vitals    /77 (BP 1 Location: Left arm, BP Patient Position: Sitting)    Pulse 88    Temp 98 °F (36.7 °C)    Resp 18    Ht 5' 7\" (1.702 m)    Wt 131 lb (59.4 kg)    SpO2 93%    BMI 20.52 kg/m2     General: awake, alert, and oriented. MAEx4. No acute distress. Thin and frail   HEENT Exam:     Normocephalic, atraumatic. Sclera anicteric . Neck: supple, no lymphadenopathy  Lung Exam:     Not  dyspneic. Respirations unlabored. . Lungs:   Crackles throughout. No rhonchi, or rubs heard on auscultation. Heart Exam:       PMI nondisplaced, diminished, laterally displaced, Unable to palpate PMI due to body habitus Rate: Rhythm: regular,   2/6 systolic murmur heard at LUSB/RUSB, no click, or rub. No JVD, brisk carotid upstroke, no carotid bruits, No HJR      Abdomen Exam:      obese, soft, nontender. + Bowel sounds. No palpable masses; organomegaly. No bruits or pulsatile mass. : voiding     Extremities Exam:      Atraumatic. No clubbing, cyanosis, edema, ulcers, varicose veins, rash, swelling, erythema.     Vascular Exam:      radial, brachial, dorsalis pedis, posterior tibial, are equal and strong bilaterally     Neuro exam:  No focal deficits   Psy Exam: Normal affect, does not appear anxious or agitatied        Recent Results (from the past 12 hour(s))   CBC WITH AUTOMATED DIFF    Collection Time: 12/29/17 10:50 AM   Result Value Ref Range    WBC 7.8 3.6 - 11.0 K/uL    RBC 4.25 3.80 - 5.20 M/uL    HGB 12.5 11.5 - 16.0 g/dL    HCT 40.4 35.0 - 47.0 %    MCV 95.1 80.0 - 99.0 FL    MCH 29.4 26.0 - 34.0 PG    MCHC 30.9 30.0 - 36.5 g/dL RDW 15.2 (H) 11.5 - 14.5 %    PLATELET 593 657 - 091 K/uL    NEUTROPHILS 75 32 - 75 %    LYMPHOCYTES 11 (L) 12 - 49 %    MONOCYTES 11 5 - 13 %    EOSINOPHILS 2 0 - 7 %    BASOPHILS 1 0 - 1 %    ABS. NEUTROPHILS 6.0 1.8 - 8.0 K/UL    ABS. LYMPHOCYTES 0.9 0.8 - 3.5 K/UL    ABS. MONOCYTES 0.8 0.0 - 1.0 K/UL    ABS. EOSINOPHILS 0.1 0.0 - 0.4 K/UL    ABS. BASOPHILS 0.0 0.0 - 0.1 K/UL   METABOLIC PANEL, COMPREHENSIVE    Collection Time: 12/29/17 10:50 AM   Result Value Ref Range    Sodium 140 136 - 145 mmol/L    Potassium 4.9 3.5 - 5.1 mmol/L    Chloride 104 97 - 108 mmol/L    CO2 27 21 - 32 mmol/L    Anion gap 9 5 - 15 mmol/L    Glucose 99 65 - 100 mg/dL    BUN 18 6 - 20 MG/DL    Creatinine 0.91 0.55 - 1.02 MG/DL    BUN/Creatinine ratio 20 12 - 20      GFR est AA >60 >60 ml/min/1.73m2    GFR est non-AA 59 (L) >60 ml/min/1.73m2    Calcium 8.7 8.5 - 10.1 MG/DL    Bilirubin, total 0.4 0.2 - 1.0 MG/DL    ALT (SGPT) 89 (H) 12 - 78 U/L    AST (SGOT) 179 (H) 15 - 37 U/L    Alk. phosphatase 46 45 - 117 U/L    Protein, total 7.5 6.4 - 8.2 g/dL    Albumin 3.4 (L) 3.5 - 5.0 g/dL    Globulin 4.1 (H) 2.0 - 4.0 g/dL    A-G Ratio 0.8 (L) 1.1 - 2.2     CK W/ REFLX CKMB    Collection Time: 12/29/17 10:50 AM   Result Value Ref Range    CK 3645 (H) 26 - 192 U/L   PROTHROMBIN TIME + INR    Collection Time: 12/29/17 10:50 AM   Result Value Ref Range    INR 1.1 0.9 - 1.1      Prothrombin time 10.8 9.0 - 11.1 sec   TSH 3RD GENERATION    Collection Time: 12/29/17 10:50 AM   Result Value Ref Range    TSH 2.17 0.36 - 3.74 uIU/mL   CK-MB,QUANT.     Collection Time: 12/29/17 10:50 AM   Result Value Ref Range    CK - .6 (H) <3.6 NG/ML    CK-MB Index 10.7 (H) 0 - 2.5     TROPONIN I    Collection Time: 12/29/17 10:51 AM   Result Value Ref Range    Troponin-I, Qt. 0.22 (H) <0.05 ng/mL   NT-PRO BNP    Collection Time: 12/29/17 10:51 AM   Result Value Ref Range    NT pro-BNP 7148 (H) 0 - 450 PG/ML   LIPASE    Collection Time: 12/29/17 10:51 AM Result Value Ref Range    Lipase 332 73 - 393 U/L   MAGNESIUM    Collection Time: 12/29/17 10:51 AM   Result Value Ref Range    Magnesium 2.2 1.6 - 2.4 mg/dL       Opal Henderson NP  [phone]  [facility]

## 2017-12-29 NOTE — ED NOTES
Pt ambulated to restroom without difficulty. Gait was steady and even. No assistance required. Pt returned to room, monitoring equipment reapplied. Will continue to monitor patient.

## 2017-12-29 NOTE — PROGRESS NOTES
[facility]: MED RECONCILIATION      Medications added:   · Tylenol prn pain      Medications adjusted:  · Tylenol 500mg- no previous frequency  · Meloxicam- previously daily  · Adjsuted times of admin for simvastatin, donepezil    Information obtained from: Transfer papers- Gosia Owens, [address]      Allergies: Pcn [penicillins] and Sulfa (sulfonamide antibiotics)    Prior to Admission Medications:     Medication Documentation Review Audit       Reviewed by Que Dugan. Michaela Torres (Pharmacist) on 12/29/17 at 1410         Medication Sig Documenting Provider Last Dose Status Taking?      acetaminophen (TYLENOL) 325 mg tablet Take 650 mg by mouth every four (4) hours as needed for Pain. Historical Provider  Active Yes    acetaminophen (TYLENOL) 500 mg tablet Take 500 mg by mouth three (3) times daily. 0800, 1300, 1700 Historical Provider 12/29/2017 AM Active Yes    busPIRone (BUSPAR) 7.5 mg tablet Take 7.5 mg by mouth two (2) times a day. 0800, 2100 Phys MD Merari 12/29/2017 AM Active Yes    cyanocobalamin (VITAMIN B-12) 1,000 mcg tablet Take 1,000 mcg by mouth daily. 0800 Edmond Gage MD 12/29/2017 AM Active Yes    donepezil (ARICEPT) 10 mg tablet Take 10 mg by mouth every evening. 1900 Historical Provider 12/28/2017 PM Active Yes    levothyroxine (SYNTHROID) 88 mcg tablet Take 88 mcg by mouth six (6) days a week. Mon-Sat daily before breakfast Historical Provider 12/29/2017 AM Active Yes    meloxicam (MOBIC) 7.5 mg tablet Take 7.5 mg by mouth daily as needed for Pain. Historical Provider  Active Yes    simvastatin (ZOCOR) 40 mg tablet Take 40 mg by mouth every evening. 1900 Historical Provider 12/28/2017 PM Active Yes                Thank you,  Hudson Erickson, Pharm. D.

## 2017-12-30 VITALS
TEMPERATURE: 98 F | BODY MASS INDEX: 20.56 KG/M2 | RESPIRATION RATE: 13 BRPM | WEIGHT: 131 LBS | HEIGHT: 67 IN | HEART RATE: 97 BPM | OXYGEN SATURATION: 98 % | SYSTOLIC BLOOD PRESSURE: 168 MMHG | DIASTOLIC BLOOD PRESSURE: 91 MMHG

## 2017-12-30 PROBLEM — E53.8 B12 DEFICIENCY: Chronic | Status: RESOLVED | Noted: 2017-02-14 | Resolved: 2017-12-30

## 2017-12-30 PROBLEM — I21.4 NSTEMI (NON-ST ELEVATED MYOCARDIAL INFARCTION) (HCC): Status: RESOLVED | Noted: 2017-12-29 | Resolved: 2017-12-30

## 2017-12-30 PROBLEM — J18.9 PNEUMONIA: Status: RESOLVED | Noted: 2017-12-29 | Resolved: 2017-12-30

## 2017-12-30 PROBLEM — R62.7 FTT (FAILURE TO THRIVE) IN ADULT: Status: ACTIVE | Noted: 2017-12-30

## 2017-12-30 PROBLEM — R74.8 ELEVATED CK: Status: ACTIVE | Noted: 2017-12-30

## 2017-12-30 LAB
ANION GAP SERPL CALC-SCNC: 11 MMOL/L (ref 5–15)
ATRIAL RATE: 87 BPM
BUN SERPL-MCNC: 15 MG/DL (ref 6–20)
BUN/CREAT SERPL: 17 (ref 12–20)
CALCIUM SERPL-MCNC: 8.3 MG/DL (ref 8.5–10.1)
CALCULATED P AXIS, ECG09: 49 DEGREES
CALCULATED R AXIS, ECG10: -21 DEGREES
CALCULATED T AXIS, ECG11: 65 DEGREES
CHLORIDE SERPL-SCNC: 106 MMOL/L (ref 97–108)
CHOLEST SERPL-MCNC: 143 MG/DL
CK SERPL-CCNC: 2919 U/L (ref 26–192)
CK SERPL-CCNC: 2990 U/L (ref 26–192)
CO2 SERPL-SCNC: 26 MMOL/L (ref 21–32)
CREAT SERPL-MCNC: 0.89 MG/DL (ref 0.55–1.02)
DIAGNOSIS, 93000: NORMAL
ERYTHROCYTE [DISTWIDTH] IN BLOOD BY AUTOMATED COUNT: 15.1 % (ref 11.5–14.5)
GLUCOSE SERPL-MCNC: 94 MG/DL (ref 65–100)
HCT VFR BLD AUTO: 36.4 % (ref 35–47)
HDLC SERPL-MCNC: 48 MG/DL
HDLC SERPL: 3 {RATIO} (ref 0–5)
HGB BLD-MCNC: 11.5 G/DL (ref 11.5–16)
LDLC SERPL CALC-MCNC: 72.2 MG/DL (ref 0–100)
LIPID PROFILE,FLP: NORMAL
MAGNESIUM SERPL-MCNC: 1.8 MG/DL (ref 1.6–2.4)
MCH RBC QN AUTO: 29 PG (ref 26–34)
MCHC RBC AUTO-ENTMCNC: 31.6 G/DL (ref 30–36.5)
MCV RBC AUTO: 91.7 FL (ref 80–99)
P-R INTERVAL, ECG05: 194 MS
PHOSPHATE SERPL-MCNC: 3.6 MG/DL (ref 2.6–4.7)
PLATELET # BLD AUTO: 215 K/UL (ref 150–400)
POTASSIUM SERPL-SCNC: 3.5 MMOL/L (ref 3.5–5.1)
Q-T INTERVAL, ECG07: 386 MS
QRS DURATION, ECG06: 102 MS
QTC CALCULATION (BEZET), ECG08: 464 MS
RBC # BLD AUTO: 3.97 M/UL (ref 3.8–5.2)
SODIUM SERPL-SCNC: 143 MMOL/L (ref 136–145)
TRIGL SERPL-MCNC: 114 MG/DL (ref ?–150)
TROPONIN I SERPL-MCNC: 0.28 NG/ML
VENTRICULAR RATE, ECG03: 87 BPM
VLDLC SERPL CALC-MCNC: 22.8 MG/DL
WBC # BLD AUTO: 5.6 K/UL (ref 3.6–11)

## 2017-12-30 PROCEDURE — 82550 ASSAY OF CK (CPK): CPT | Performed by: INTERNAL MEDICINE

## 2017-12-30 PROCEDURE — 74011250637 HC RX REV CODE- 250/637: Performed by: INTERNAL MEDICINE

## 2017-12-30 PROCEDURE — 86738 MYCOPLASMA ANTIBODY: CPT | Performed by: INTERNAL MEDICINE

## 2017-12-30 PROCEDURE — 83735 ASSAY OF MAGNESIUM: CPT | Performed by: INTERNAL MEDICINE

## 2017-12-30 PROCEDURE — 84484 ASSAY OF TROPONIN QUANT: CPT | Performed by: INTERNAL MEDICINE

## 2017-12-30 PROCEDURE — 84100 ASSAY OF PHOSPHORUS: CPT | Performed by: INTERNAL MEDICINE

## 2017-12-30 PROCEDURE — 80061 LIPID PANEL: CPT | Performed by: INTERNAL MEDICINE

## 2017-12-30 PROCEDURE — 65270000029 HC RM PRIVATE

## 2017-12-30 PROCEDURE — 85027 COMPLETE CBC AUTOMATED: CPT | Performed by: INTERNAL MEDICINE

## 2017-12-30 PROCEDURE — 36415 COLL VENOUS BLD VENIPUNCTURE: CPT | Performed by: INTERNAL MEDICINE

## 2017-12-30 PROCEDURE — 80048 BASIC METABOLIC PNL TOTAL CA: CPT | Performed by: INTERNAL MEDICINE

## 2017-12-30 RX ORDER — GUAIFENESIN/DEXTROMETHORPHAN 100-10MG/5
5 SYRUP ORAL
Qty: 1 BOTTLE | Refills: 0 | Status: SHIPPED | OUTPATIENT
Start: 2017-12-30 | End: 2018-01-09

## 2017-12-30 RX ORDER — CARVEDILOL 3.12 MG/1
3.12 TABLET ORAL 2 TIMES DAILY WITH MEALS
Qty: 60 TAB | Refills: 0 | Status: SHIPPED | OUTPATIENT
Start: 2017-12-30 | End: 2018-01-29

## 2017-12-30 RX ORDER — CARVEDILOL 6.25 MG/1
3.12 TABLET ORAL 2 TIMES DAILY WITH MEALS
Status: DISCONTINUED | OUTPATIENT
Start: 2017-12-30 | End: 2017-12-30 | Stop reason: HOSPADM

## 2017-12-30 RX ORDER — GUAIFENESIN/DEXTROMETHORPHAN 100-10MG/5
5 SYRUP ORAL
Status: DISCONTINUED | OUTPATIENT
Start: 2017-12-30 | End: 2017-12-30 | Stop reason: HOSPADM

## 2017-12-30 RX ADMIN — BUSPIRONE HYDROCHLORIDE 7.5 MG: 5 TABLET ORAL at 08:07

## 2017-12-30 RX ADMIN — LEVOTHYROXINE SODIUM 88 MCG: 88 TABLET ORAL at 08:07

## 2017-12-30 RX ADMIN — CARVEDILOL 3.12 MG: 6.25 TABLET, FILM COATED ORAL at 08:08

## 2017-12-30 NOTE — ED NOTES
Hospitalist made aware of patient's last troponin level of 0.28. No orders received to repeat any more troponin. Will continue to monitor patient. Call bell and son at bedside.

## 2017-12-30 NOTE — DISCHARGE SUMMARY
Physician Discharge Summary     Patient ID:  Melyssa Muniz  [id number]  55 y.o.  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 Also, she has a breast mass Dx a year ago, and per her choice, no Bx or workup done. Thus, she may have stage 4 cancer. Even beyond that, IPF and dementia are progressive, untreatable and ultimately fatal.  Appropriate for hospice, and son, at bedside, wants this. He already has hospice contacts. He agrees with no further inpatient testing or treatment.     Interstitial pulmonary fibrosis / Cough / Abnormal chest xray - POA, NOT Pneumonia. No SIRS criteria, no sputum. Stop antibiotics. Rx Robutussin DM. Family has decided against any further eval or treatment by pulmonary.     Dermatomyositis / Elevated CK - POA, NOT NSTEMI. Cards signed off. CK out of proportion to troponin, thus non-cardiac source.     Dementia / Anxiety - Gradual worsening over months. Continue buspirone, donepazil     Sinus tachycardia - Start low dose coreg for symptoms control     Hypothyroidism - Continue synthroid. TSH normal.     Hypercholesterolemia - Stop all testing or treatment. Son agrees.     PCP: Charles Lofton NP    Consults: Cardiology    Significant Diagnostic Studies: See Hospital Course    Discharged back to LTC memory unit in improved condition.     Discharge Exam:   BP (!) 147/91    Pulse 98    Temp 98 °F (36.7 °C)    Resp 21  Ht 5' 7\" (1.702 m)    Wt 59.4 kg (131 lb)    SpO2 98%    BMI 20.52 kg/m2      Gen:  Thin, frail, in no acute distress  HEENT:  Pink conjunctivae, PERRL, hearing intact to voice, moist mucous membranes  Neck:  Supple, without masses, thyroid non-tender  Resp:  No accessory muscle use, bilateral breath sounds with rales  Card:  No murmurs, normal S1, S2 without thrills, bruits or peripheral edema  Abd:  Soft, non-tender, non-distended, normoactive bowel sounds are present, no mass  Lymph:  No cervical or inguinal adenopathy  Musc:  No cyanosis or clubbing  Skin:  No rashes or ulcers, skin turgor is reduced  Neuro:  Cranial nerves are grossly intact, general motor weakness, follows commands vaguely  Psych:  Poor insight, oriented to person    Patient Instructions:   Current Discharge Medication List      START taking these medications    Details   carvedilol (COREG) 3.125 mg tablet Take 1 Tab by mouth two (2) times daily (with meals) for 30 days. Indications: hypertension  Qty: 60 Tab, Refills: 0      guaiFENesin-dextromethorphan (ROBITUSSIN DM) 100-10 mg/5 mL syrup Take 5 mL by mouth every six (6) hours as needed for up to 10 days. Qty: 1 Bottle, Refills: 0         CONTINUE these medications which have NOT CHANGED    Details   busPIRone (BUSPAR) 7.5 mg tablet Take 7.5 mg by mouth two (2) times a day. 0800, 2100      !! acetaminophen (TYLENOL) 500 mg tablet Take 500 mg by mouth three (3) times daily. 0800, 1300, 1700      donepezil (ARICEPT) 10 mg tablet Take 10 mg by mouth every evening. 1900      levothyroxine (SYNTHROID) 88 mcg tablet Take 88 mcg by mouth six (6) days a week. Mon-Sat daily before breakfast      meloxicam (MOBIC) 7.5 mg tablet Take 7.5 mg by mouth daily as needed for Pain. !! acetaminophen (TYLENOL) 325 mg tablet Take 650 mg by mouth every four (4) hours as needed for Pain. !! - Potential duplicate medications found. Please discuss with provider.       STOP taking these medications cyanocobalamin (VITAMIN B-12) 1,000 mcg tablet Comments:   Reason for Stopping:         simvastatin (ZOCOR) 40 mg tablet Comments:   Reason for Stopping:             Activity: Activity as tolerated  Diet: Regular Diet and Comfort feeding  Wound Care: None needed    Follow-up with your PCP and hospice in a few weeks.   Follow-up tests/labs - none    Signed:  Alexandra Agosto MD  12/30/2017  7:52 AM

## 2017-12-30 NOTE — DISCHARGE INSTRUCTIONS
Patient Discharge Instructions    Marissa Fink / [id number] : 1933    Admitted 2017 Discharged: 2017     Primary Diagnoses  Problem List as of 2017  Date Reviewed: 2017          Codes Class Noted - Resolved   Anxiety   * (Principal)FTT (failure to thrive) in adult   Elevated CK   Dementia (Chronic)   Dermatomyositis (HCC) (Chronic)   Interstitial pulmonary fibrosis (HCC) (Chronic)   Hypercholesterolemia (Chronic)   Hypothyroidism (Chronic)          Take Home Medications     · It is important that you take the medication exactly as they are prescribed. · Keep your medication in the bottles provided by the pharmacist and keep a list of the medication names, dosages, and times to be taken in your wallet. · Do not take other medications without consulting your doctor. What to do at Home    Recommended diet: Regular Diet and Comfort feeding    Recommended activity: Activity as tolerated    If you experience worse symptoms, please follow up with hospice. Follow-up with your PCP in a few weeks        Information obtained by :  I understand that if any problems occur once I am at home I am to contact my physician. I understand and acknowledge receipt of the instructions indicated above.                                                                                                                                            Physician's or R.N.'s Signature                                                                  Date/Time                                                                                                                                              Patient or Representative Signature                                                          Date/Time

## 2017-12-30 NOTE — ED NOTES
Pt eating breakfast assisted by son. Discharge instructions given to son including an extra copy for [facility].

## 2017-12-30 NOTE — ED NOTES
Pt is medicated as ordered. Pt is educated on the relational and reasoning for medication. Reviewed side effects and ensured no previous administration.

## 2017-12-30 NOTE — PROGRESS NOTES
Jareth Torres [facility] [address]  [address] [facility], [place], [address] [place] [address]  [phone]      Medical Progress Note      NAME: Niurka Mills   :  1933  MRM:  [id number]    Date/Time: 2017  7:43 AM       Assessment and Plan:     FTT (failure to thrive) in adult / Weight loss - POA, severe, due to IPF, dermatomyositis and dementia. Also, she has a breast mass Dx a year ago, and per her choice, no Bx or workup done. Thus, she may have stage 4 cancer. Even beyond that, IPF and dementia are progressive, untreatable and ultimately fatal.  Appropriate for hospice, and son, at bedside, wants this. He already has hospice contacts. He agrees with no further inpatient testing or treatment. Interstitial pulmonary fibrosis / Abnormal chest xray - POA, NOT Pneumonia. No SIRS criteria, no sputum. Stop antibiotics. Family has decided against any further eval or treatment by pulmonary. Dermatomyositis / Elevated CK - POA, NOT NSTEMI. Cards signed off. CK out of proportion to troponin, thus non-cardiac source. Dementia / Anxiety - Gradual worsening over months. Continue buspirone, donepazil    Sinus tachycardia - Start low dose coreg for symptoms control    Hypothyroidism - Continue synthroid. TSH normal.    Hypercholesterolemia - Stop all testing or treatment. Son agrees. Subjective:     Chief Complaint:  Weak vague. Son at bedside states she had a restless night. ROS:  (bold if positive, if negative)      Tolerating some PT  Tolerating Diet        Objective:     Last 24hrs VS reviewed since prior progress note.  Most recent are:    Visit Vitals    BP (!) 147/91    Pulse 98    Temp 98 °F (36.7 °C)    Resp 21    Ht 5' 7\" (1.702 m)    Wt 59.4 kg (131 lb)    SpO2 98%    BMI 20.52 kg/m2     SpO2 Readings from Last 6 Encounters:   17 98%   17 98%   16 99%   07/31/15 99%   07/08/15 95%   05/15/15 94%        No intake or output data in the 24 hours ending 12/30/17 0749     Physical Exam:    Gen:  Thin, frail, in no acute distress  HEENT:  Pink conjunctivae, PERRL, hearing intact to voice, moist mucous membranes  Neck:  Supple, without masses, thyroid non-tender  Resp:  No accessory muscle use, bilateral breath sounds with rales  Card:  No murmurs, normal S1, S2 without thrills, bruits or peripheral edema  Abd:  Soft, non-tender, non-distended, normoactive bowel sounds are present, no mass  Lymph:  No cervical or inguinal adenopathy  Musc:  No cyanosis or clubbing  Skin:  No rashes or ulcers, skin turgor is reduced  Neuro:  Cranial nerves are grossly intact, general motor weakness, follows commands vaguely  Psych:  Poor insight, oriented to person    Telemetry reviewed:   normal sinus rhythm  __________________________________________________________________  Medications Reviewed: (see below)  Medications:     Current Facility-Administered Medications   Medication Dose Route Frequency    carvedilol (COREG) tablet 3.125 mg  3.125 mg Oral BID WITH MEALS    sodium chloride (NS) flush 5-10 mL  5-10 mL IntraVENous Q8H    sodium chloride (NS) flush 5-10 mL  5-10 mL IntraVENous PRN    acetaminophen (TYLENOL) tablet 650 mg  650 mg Oral Q4H PRN    oxyCODONE-acetaminophen (PERCOCET) 5-325 mg per tablet 1 Tab  1 Tab Oral Q4H PRN    HYDROmorphone (DILAUDID) injection 0.5 mg  0.5 mg IntraVENous Q4H PRN    prochlorperazine (COMPAZINE) injection 10 mg  10 mg IntraVENous Q6H PRN    busPIRone (BUSPAR) tablet 7.5 mg  7.5 mg Oral BID    donepezil (ARICEPT) tablet 10 mg  10 mg Oral QPM    levothyroxine (SYNTHROID) tablet 88 mcg  88 mcg Oral Once per day on Mon Tue Wed Thu Fri Sat     Current Outpatient Prescriptions   Medication Sig    busPIRone (BUSPAR) 7.5 mg tablet Take 7.5 mg by mouth two (2) times a day. 0800, 2100    cyanocobalamin (VITAMIN B-12) 1,000 mcg tablet Take 1,000 mcg by mouth daily.  0800    acetaminophen (TYLENOL) 500 mg tablet Take 500 mg by mouth three (3) times daily. 0800, 1300, 1700    donepezil (ARICEPT) 10 mg tablet Take 10 mg by mouth every evening. 1900    levothyroxine (SYNTHROID) 88 mcg tablet Take 88 mcg by mouth six (6) days a week. Mon-Sat daily before breakfast    simvastatin (ZOCOR) 40 mg tablet Take 40 mg by mouth every evening. 1900    meloxicam (MOBIC) 7.5 mg tablet Take 7.5 mg by mouth daily as needed for Pain.  acetaminophen (TYLENOL) 325 mg tablet Take 650 mg by mouth every four (4) hours as needed for Pain. Lab Data Reviewed: (see below)  Lab Review:     Recent Labs      12/30/17   0336  12/29/17   1050   WBC  5.6  7.8   HGB  11.5  12.5   HCT  36.4  40.4   PLT  215  234     Recent Labs      12/30/17   0336  12/29/17   1051  12/29/17   1050   NA  143   --   140   K  3.5   --   4.9   CL  106   --   104   CO2  26   --   27   GLU  94   --   99   BUN  15   --   18   CREA  0.89   --   0.91   CA  8.3*   --   8.7   MG  1.8  2.2   --    PHOS  3.6   --    --    ALB   --    --   3.4*   TBILI   --    --   0.4   SGOT   --    --   179*   ALT   --    --   89*   INR   --    --   1.1     Lab Results   Component Value Date/Time    Glucose (POC) 103 07/08/2015 06:24 AM    Glucose (POC) 163 07/07/2015 09:45 PM    Glucose (POC) 176 07/07/2015 04:05 PM    Glucose (POC) 186 07/07/2015 11:11 AM    Glucose (POC) 134 07/07/2015 06:16 AM     No results for input(s): PH, PCO2, PO2, HCO3, FIO2 in the last 72 hours. Recent Labs      12/29/17   1050   INR  1.1     All Micro Results     Procedure Component Value Units Date/Time    MYCOPLASMA AB, IGG/IGM [[id number]] Collected:  12/30/17 0018    Order Status:  Completed Specimen:  Serum Updated:  12/30/17 0026    DARYL Smalls, UR/CSF [[id number]] Collected:  12/29/17 1439    Order Status:  Completed Updated:  12/30/17 0004    CULTURE, RESPIRATORY/SPUTUM/BRONCH Christophe Gails STAIN [[id number]]     Order Status:  Sent Specimen:  Sputum     LEGIONELLA PNEUMOPHILA AG, URINE [[id number]]     Order Status:  Sent Specimen:  Urine from Urine     CULTURE, BLOOD [[id number]] Collected:  12/29/17 1242    Order Status:  Completed Specimen:  Blood Updated:  12/29/17 1303    CULTURE, BLOOD [[id number]] Collected:  12/29/17 1242    Order Status:  Completed Specimen:  Blood Updated:  12/29/17 1303          I have reviewed notes of prior 24hr.     Other pertinent lab: none    Total time spent with patient: [address] [place] [address] discussed with: Patient, Family, Nursing Staff, Consultant/Specialist and >50% of time spent in counseling and coordination of care    Discussed:  Code Status, Care Plan and D/C Planning    Prophylaxis:  H2B/PPI    Disposition:  SNF/LTC           ___________________________________________________    Attending Physician: Carina Milton MD

## 2017-12-30 NOTE — ED NOTES
Bedside and Verbal shift change report given to MT, RN (oncoming nurse) by Kavin Amador RN (offgoing nurse). Report included the following information SBAR, ED Summary, Intake/Output, MAR, Recent Results and Cardiac Rhythm SR with PVC.

## 2018-01-01 LAB
FLUID CULTURE, SPNG2: NORMAL
ORGANISM ID, SPNG3: NORMAL
PLEASE NOTE, SPNG4: NORMAL
S PNEUM AG SPEC QL LA: NEGATIVE
SPECIMEN SOURCE: NORMAL
SPECIMEN, SPNG1: NORMAL

## 2018-01-03 LAB
M PNEUMO IGG SER IA-ACNC: 164 U/ML (ref 0–99)
M PNEUMO IGM SER IA-ACNC: <770 U/ML (ref 0–769)

## 2018-01-05 LAB
BACTERIA SPEC CULT: NORMAL
BACTERIA SPEC CULT: NORMAL
SERVICE CMNT-IMP: NORMAL
SERVICE CMNT-IMP: NORMAL